# Patient Record
Sex: FEMALE
[De-identification: names, ages, dates, MRNs, and addresses within clinical notes are randomized per-mention and may not be internally consistent; named-entity substitution may affect disease eponyms.]

---

## 2017-10-10 ENCOUNTER — ASOB RESULT (OUTPATIENT)
Age: 32
End: 2017-10-10

## 2017-10-10 ENCOUNTER — APPOINTMENT (OUTPATIENT)
Dept: ANTEPARTUM | Facility: CLINIC | Age: 32
End: 2017-10-10
Payer: COMMERCIAL

## 2017-10-10 PROCEDURE — 76857 US EXAM PELVIC LIMITED: CPT

## 2017-10-10 PROCEDURE — 76830 TRANSVAGINAL US NON-OB: CPT

## 2018-01-19 ENCOUNTER — APPOINTMENT (OUTPATIENT)
Dept: ANTEPARTUM | Facility: CLINIC | Age: 33
End: 2018-01-19
Payer: COMMERCIAL

## 2018-01-19 ENCOUNTER — ASOB RESULT (OUTPATIENT)
Age: 33
End: 2018-01-19

## 2018-01-19 PROCEDURE — 76817 TRANSVAGINAL US OBSTETRIC: CPT

## 2018-10-23 ENCOUNTER — APPOINTMENT (OUTPATIENT)
Dept: FAMILY MEDICINE | Facility: CLINIC | Age: 33
End: 2018-10-23
Payer: COMMERCIAL

## 2018-10-23 VITALS
DIASTOLIC BLOOD PRESSURE: 60 MMHG | OXYGEN SATURATION: 98 % | BODY MASS INDEX: 25.4 KG/M2 | TEMPERATURE: 98.4 F | RESPIRATION RATE: 16 BRPM | HEART RATE: 72 BPM | HEIGHT: 62 IN | SYSTOLIC BLOOD PRESSURE: 104 MMHG | WEIGHT: 138 LBS

## 2018-10-23 DIAGNOSIS — Z87.09 PERSONAL HISTORY OF OTHER DISEASES OF THE RESPIRATORY SYSTEM: ICD-10-CM

## 2018-10-23 DIAGNOSIS — M54.5 LOW BACK PAIN: ICD-10-CM

## 2018-10-23 PROCEDURE — 99213 OFFICE O/P EST LOW 20 MIN: CPT

## 2018-10-23 RX ORDER — PRENATAL VIT NO.130/IRON/FOLIC 27MG-0.8MG
28-0.8 TABLET ORAL DAILY
Qty: 90 | Refills: 1 | Status: ACTIVE | COMMUNITY
Start: 2018-10-23

## 2018-10-23 NOTE — REVIEW OF SYSTEMS
[Fever] : no fever [Chills] : no chills [Chest Pain] : no chest pain [Shortness Of Breath] : no shortness of breath [Cough] : no cough [Back Pain] : back pain [FreeTextEntry9] : as per HPI

## 2018-10-23 NOTE — PHYSICAL EXAM
[Normal Appearance] : was normal in appearance [Neck Supple] : was supple [No Respiratory Distress] : no respiratory distress  [Clear to Auscultation] : lungs were clear to auscultation bilaterally [Normal Rate] : normal rate  [Regular Rhythm] : with a regular rhythm [Normal S1, S2] : normal S1 and S2 [No Murmur] : no murmur heard [No Edema] : there was no peripheral edema [Grossly Normal Strength/Tone] : grossly normal strength/tone [No Focal Deficits] : no focal deficits [Alert and Oriented x3] : oriented to person, place, and time [No Acute Distress] : no acute distress [Well Nourished] : well nourished [Well Developed] : well developed [Well-Appearing] : well-appearing [No Rash] : no rash [de-identified] : tenderness to lumbar region near L3/L4 region

## 2018-10-23 NOTE — HISTORY OF PRESENT ILLNESS
[FreeTextEntry8] : \par 32 year old female presents c/o worsening back pain, started about 2 months ago. Usually starts at her lower back and radiates up her back. Intermittent pain, can be sharp at times. No injury. Did give birth to her son on 8/5/18, did have an epidural at the time. Also reports having neck pain. Can sometimes get numbness and tingling to her fingers. No bowel or bladder incontinence. Currently breast feeding. Taking Tylenol or Ibuprofen with some improvement.

## 2018-10-23 NOTE — ASSESSMENT
[FreeTextEntry1] : given symptoms and history of Epidural, advised to see neurosurgery for further evaluation\par c/w Tylenol or Ibuprofen for pain control\par \par

## 2018-10-24 ENCOUNTER — APPOINTMENT (OUTPATIENT)
Dept: NEUROSURGERY | Facility: CLINIC | Age: 33
End: 2018-10-24

## 2020-03-03 ENCOUNTER — APPOINTMENT (OUTPATIENT)
Dept: FAMILY MEDICINE | Facility: CLINIC | Age: 35
End: 2020-03-03

## 2020-03-03 VITALS
WEIGHT: 134 LBS | HEIGHT: 61 IN | OXYGEN SATURATION: 99 % | SYSTOLIC BLOOD PRESSURE: 110 MMHG | DIASTOLIC BLOOD PRESSURE: 60 MMHG | BODY MASS INDEX: 25.3 KG/M2 | HEART RATE: 76 BPM

## 2021-05-06 ENCOUNTER — TRANSCRIPTION ENCOUNTER (OUTPATIENT)
Age: 36
End: 2021-05-06

## 2021-06-02 ENCOUNTER — TRANSCRIPTION ENCOUNTER (OUTPATIENT)
Age: 36
End: 2021-06-02